# Patient Record
Sex: FEMALE
[De-identification: names, ages, dates, MRNs, and addresses within clinical notes are randomized per-mention and may not be internally consistent; named-entity substitution may affect disease eponyms.]

---

## 2018-02-19 LAB
ANION GAP SERPL CALC-SCNC: 13.9 MMOL/L (ref 8–16)
BASOPHILS # BLD AUTO: 0 10*3/UL (ref 0–0.1)
BASOPHILS NFR BLD AUTO: 0.2 % (ref 0–1)
BUN SERPL-MCNC: 12 MG/DL (ref 7–26)
BUN/CREAT SERPL: 15 (ref 6–25)
CALCIUM SERPL-MCNC: 9.3 MG/DL (ref 8.4–10.2)
CHLORIDE SERPL-SCNC: 105 MMOL/L (ref 98–107)
CO2 SERPL-SCNC: 28 MMOL/L (ref 22–29)
DEPRECATED NEUTROPHILS # BLD AUTO: 6.3 10*3/UL (ref 2.1–6.9)
EGFRCR SERPLBLD CKD-EPI 2021: > 60 ML/MIN (ref 60–?)
EOSINOPHIL # BLD AUTO: 0.1 10*3/UL (ref 0–0.4)
EOSINOPHIL NFR BLD AUTO: 1.1 % (ref 0–6)
ERYTHROCYTE [DISTWIDTH] IN CORD BLOOD: 12.9 % (ref 11.7–14.4)
GLUCOSE SERPLBLD-MCNC: 105 MG/DL (ref 74–118)
HCT VFR BLD AUTO: 40.6 % (ref 34.2–44.1)
HGB BLD-MCNC: 13.2 G/DL (ref 12–16)
LYMPHOCYTES # BLD: 2.3 10*3/UL (ref 1–3.2)
LYMPHOCYTES NFR BLD AUTO: 25.3 % (ref 18–39.1)
MCH RBC QN AUTO: 29.5 PG (ref 28–32)
MCHC RBC AUTO-ENTMCNC: 32.5 G/DL (ref 31–35)
MCV RBC AUTO: 90.8 FL (ref 81–99)
MONOCYTES # BLD AUTO: 0.4 10*3/UL (ref 0.2–0.8)
MONOCYTES NFR BLD AUTO: 4.1 % (ref 4.4–11.3)
NEUTS SEG NFR BLD AUTO: 69 % (ref 38.7–80)
PLATELET # BLD AUTO: 340 X10E3/UL (ref 140–360)
POTASSIUM SERPL-SCNC: 3.9 MMOL/L (ref 3.5–5.1)
RBC # BLD AUTO: 4.47 X10E6/UL (ref 3.6–5.1)
SODIUM SERPL-SCNC: 143 MMOL/L (ref 136–145)

## 2018-02-19 NOTE — DIAGNOSTIC IMAGING REPORT
PROCEDURE:

Frontal and lateral views of the chest.

 

COMPARISON: None.

 

INDICATIONS:   preop right mass under scapula friday

     

FINDINGS:

Lines/tubes:  None.

 

Lungs:  The lungs are well inflated. There is no evidence of pneumonia 

or pulmonary edema. Mild scarlike opacities and atelectasis in the lung 

bases.

 

Scattered calcified granulomas.

 

Pleura:  There is no pleural effusion or pneumothorax.

 

Heart and mediastinum:  The heart and the mediastinum are normal.

 

Bones:  No acute bony abnormality. Multilevel degenerative changes of 

the thoracic spine. The reported subscapular mass is visualized.

 

IMPRESSION: 

 

1.  No acute cardiopulmonary disease.

 

Dictated by:  Yovanny Dominguez M.D. on 2/19/2018 at 11:20     

Electronically approved by:  Yovanny Dominguez M.D. on 2/19/2018 at 11:20

## 2018-02-23 ENCOUNTER — HOSPITAL ENCOUNTER (OUTPATIENT)
Dept: HOSPITAL 88 - OR | Age: 68
Discharge: HOME | End: 2018-02-23
Attending: SURGERY
Payer: MEDICARE

## 2018-02-23 DIAGNOSIS — Z01.810: ICD-10-CM

## 2018-02-23 DIAGNOSIS — Z01.818: ICD-10-CM

## 2018-02-23 DIAGNOSIS — D17.79: Primary | ICD-10-CM

## 2018-02-23 DIAGNOSIS — Z01.812: ICD-10-CM

## 2018-02-23 PROCEDURE — 85025 COMPLETE CBC W/AUTO DIFF WBC: CPT

## 2018-02-23 PROCEDURE — 36415 COLL VENOUS BLD VENIPUNCTURE: CPT

## 2018-02-23 PROCEDURE — 23073 EXC SHOULDER TUM DEEP 5 CM/>: CPT

## 2018-02-23 PROCEDURE — 80048 BASIC METABOLIC PNL TOTAL CA: CPT

## 2018-02-23 PROCEDURE — 71046 X-RAY EXAM CHEST 2 VIEWS: CPT

## 2018-02-23 PROCEDURE — 88304 TISSUE EXAM BY PATHOLOGIST: CPT

## 2018-02-23 PROCEDURE — 93005 ELECTROCARDIOGRAM TRACING: CPT

## 2018-02-23 NOTE — XMS REPORT
Patient Summary Document

 Created on: 2018



LETICIA NORWOOD

External Reference #: [id number]

: 1950

Sex: Female



Demographics







 Address  [address] 

[place]  [address]

 

 Home Phone  [phone]

 

 Preferred Language  Unknown

 

 Marital Status  Unknown

 

 Gnosticism Affiliation  Unknown

 

 Race  Unknown

 

 Additional Race(s)  

 

 Ethnic Group  Unknown





Author







 Author  [facility]

 

 Address  Unknown

 

 Phone  Unavailable







Care Team Providers







 Care Team Member Name  Role  Phone

 

 GUS SANTANA  Unavailable  Unavailable







Problems

This patient has no known problems.



Allergies, Adverse Reactions, Alerts

This patient has no known allergies or adverse reactions.



Medications

This patient has no known medications.



Results







 Test Description  Test Time  Test Comments  Text Results  Atomic Results  
Result Comments









 CHEST 2 VIEWS            [place] [address]      Patient Name: LETICIA NORWOOD 
  MR #: [id number]    : 1950 Age/Sex: 67/F  Acct #: [id number] Req #
: [id number]  Adm Physician:     Ordered by: GUS SANTANA MD  Report #: 
[id number]   Location: OR  Room/Bed:     ________________________________________
___________________________________________________________    Procedure: [id number]
[id number] DX/CHEST 2 VIEWS  Exam Date:                             Exam Time:        
REPORT STATUS: Signed    PROCEDURE:   Frontal and lateral views of the chest.  
     COMPARISON: None.       INDICATIONS:   preop right mass under scapula 
friday           FINDINGS:   Lines/tubes:  None.       Lungs:  The lungs are 
well inflated. There is no evidence of pneumonia    or pulmonary edema. Mild 
scarlike opacities and atelectasis in the lung    bases.       Scattered 
calcified granulomas.       Pleura:  There is no pleural effusion or 
pneumothorax.       Heart and mediastinum:  The heart and the mediastinum are 
normal.       Bones:  No acute bony abnormality. Multilevel degenerative 
changes of    the thoracic spine. The reported subscapular mass is visualized. 
      IMPRESSION:        1.  No acute cardiopulmonary disease.       Dictated by
:  Breana Dominguez M.D. on 2018 at 11:20        Electronically approved by
:  Breana Dominguez M.D. on 2018 at 11:20                   Dictated By: 
BREANA DOMINGUEZ MD  Electronically Signed By: BREANA DOMINGUEZ MD on 18 
1120  Transcribed By: MO on 18 1120       COPY TO:   GUS SANTANA MD

## 2018-02-23 NOTE — OPERATIVE REPORT
DATE OF PROCEDURE:  February 23, 2018 



PREOPERATIVE DIAGNOSIS:  Mass of the right scapular area.  



POSTOPERATIVE DIAGNOSIS:  Intramuscular mass of the right scapular area. 



OPERATION PERFORMED:  Excision of intramuscular mass of the right scapular 

area.



ANESTHESIA:  General.



COMPLICATIONS:  None.



ESTIMATED BLOOD LOSS:  Minimal.



DESCRIPTION OF PROCEDURE:  With the patient lying in bed in the lateral 

position under good general anesthesia, the right back was prepped with 

Betadine solution and draped in the usual manner.  The area overlying the 

palpable mass in the scapular area was then infiltrated with 1/4 percent 

Marcaine solution and incision was made and was carried down through the 

subcutaneous tissue and through the superficial fascia.  A 

well-encapsulated lipomatous mass was encountered, which was then slowly 

and carefully  from all of the surrounding structures and it was 

partially intramuscular and this was slowly and carefully  from 

the muscle structures and totally and completely removed and sent for 

pathological examination.  The whole area was then thoroughly irrigated.  

Perfect hemostasis was ascertained.  Subcutaneous tissue and superficial 

fascia were approximated with interrupted sutures of 3-0 Vicryl and the 

skin was closed with interrupted vertical mattress sutures of 3-0 silk.  A 

dressing was applied.  The sponge, lap and needle count was correct.  

The patient tolerated the procedure and returned to the recovery room in 

stable condition.





DD:  02/23/2018 16:18

DT:  02/23/2018 16:35

Job#:  [id number]

## 2018-04-07 ENCOUNTER — HOSPITAL ENCOUNTER (INPATIENT)
Dept: HOSPITAL 88 - ER | Age: 68
LOS: 4 days | Discharge: HOME | DRG: 195 | End: 2018-04-11
Attending: INTERNAL MEDICINE | Admitting: INTERNAL MEDICINE
Payer: MEDICARE

## 2018-04-07 VITALS — BODY MASS INDEX: 38.58 KG/M2 | WEIGHT: 231.56 LBS | HEIGHT: 65 IN

## 2018-04-07 DIAGNOSIS — E66.9: ICD-10-CM

## 2018-04-07 DIAGNOSIS — D72.819: ICD-10-CM

## 2018-04-07 DIAGNOSIS — E78.5: ICD-10-CM

## 2018-04-07 DIAGNOSIS — J18.9: Primary | ICD-10-CM

## 2018-04-07 LAB
ALBUMIN SERPL-MCNC: 4.1 G/DL (ref 3.5–5)
ALBUMIN/GLOB SERPL: 0.9 {RATIO} (ref 0.8–2)
ALP SERPL-CCNC: 89 IU/L (ref 40–150)
ALT SERPL-CCNC: 20 IU/L (ref 0–55)
AMYLASE SERPL-CCNC: 66 U/L (ref 25–125)
ANION GAP SERPL CALC-SCNC: 15.1 MMOL/L (ref 8–16)
BACTERIA URNS QL MICRO: (no result) /HPF
BASOPHILS # BLD AUTO: 0 10*3/UL (ref 0–0.1)
BASOPHILS NFR BLD AUTO: 0.3 % (ref 0–1)
BILIRUB UR QL: NEGATIVE
BUN SERPL-MCNC: 13 MG/DL (ref 7–26)
BUN/CREAT SERPL: 13 (ref 6–25)
CALCIUM SERPL-MCNC: 9.2 MG/DL (ref 8.4–10.2)
CHLORIDE SERPL-SCNC: 98 MMOL/L (ref 98–107)
CLARITY UR: (no result)
CO2 SERPL-SCNC: 24 MMOL/L (ref 22–29)
COLOR UR: YELLOW
DEPRECATED NEUTROPHILS # BLD AUTO: 4.7 10*3/UL (ref 2.1–6.9)
EGFRCR SERPLBLD CKD-EPI 2021: 53 ML/MIN (ref 60–?)
EOSINOPHIL # BLD AUTO: 0 10*3/UL (ref 0–0.4)
EOSINOPHIL NFR BLD AUTO: 0.1 % (ref 0–6)
EPI CELLS URNS QL MICRO: (no result) /LPF
ERYTHROCYTE [DISTWIDTH] IN CORD BLOOD: 13.3 % (ref 11.7–14.4)
GLOBULIN PLAS-MCNC: 4.6 G/DL (ref 2.3–3.5)
GLUCOSE SERPLBLD-MCNC: 121 MG/DL (ref 74–118)
HCT VFR BLD AUTO: 43.1 % (ref 34.2–44.1)
HGB BLD-MCNC: 14.2 G/DL (ref 12–16)
KETONES UR QL STRIP.AUTO: NEGATIVE
LEUKOCYTE ESTERASE UR QL STRIP.AUTO: (no result)
LIPASE SERPL-CCNC: 26 U/L (ref 8–78)
LYMPHOCYTES # BLD: 1.7 10*3/UL (ref 1–3.2)
LYMPHOCYTES NFR BLD AUTO: 24.5 % (ref 18–39.1)
MCH RBC QN AUTO: 29.5 PG (ref 28–32)
MCHC RBC AUTO-ENTMCNC: 32.9 G/DL (ref 31–35)
MCV RBC AUTO: 89.4 FL (ref 81–99)
MONOCYTES # BLD AUTO: 0.6 10*3/UL (ref 0.2–0.8)
MONOCYTES NFR BLD AUTO: 8 % (ref 4.4–11.3)
NEUTS SEG NFR BLD AUTO: 66.8 % (ref 38.7–80)
NITRITE UR QL STRIP.AUTO: NEGATIVE
PLATELET # BLD AUTO: 313 X10E3/UL (ref 140–360)
POTASSIUM SERPL-SCNC: 4.1 MMOL/L (ref 3.5–5.1)
PROT UR QL STRIP.AUTO: (no result)
RBC # BLD AUTO: 4.82 X10E6/UL (ref 3.6–5.1)
SODIUM SERPL-SCNC: 133 MMOL/L (ref 136–145)
SP GR UR STRIP: 1.03 (ref 1.01–1.02)
UROBILINOGEN UR STRIP-MCNC: 0.2 MG/DL (ref 0.2–1)
WBC #/AREA URNS HPF: (no result) /HPF (ref 0–5)

## 2018-04-07 PROCEDURE — 82553 CREATINE MB FRACTION: CPT

## 2018-04-07 PROCEDURE — 96374 THER/PROPH/DIAG INJ IV PUSH: CPT

## 2018-04-07 PROCEDURE — 85025 COMPLETE CBC W/AUTO DIFF WBC: CPT

## 2018-04-07 PROCEDURE — 83690 ASSAY OF LIPASE: CPT

## 2018-04-07 PROCEDURE — 87086 URINE CULTURE/COLONY COUNT: CPT

## 2018-04-07 PROCEDURE — 74177 CT ABD & PELVIS W/CONTRAST: CPT

## 2018-04-07 PROCEDURE — 80053 COMPREHEN METABOLIC PANEL: CPT

## 2018-04-07 PROCEDURE — 82150 ASSAY OF AMYLASE: CPT

## 2018-04-07 PROCEDURE — 81001 URINALYSIS AUTO W/SCOPE: CPT

## 2018-04-07 PROCEDURE — 83605 ASSAY OF LACTIC ACID: CPT

## 2018-04-07 PROCEDURE — 93005 ELECTROCARDIOGRAM TRACING: CPT

## 2018-04-07 PROCEDURE — 84484 ASSAY OF TROPONIN QUANT: CPT

## 2018-04-07 PROCEDURE — 87040 BLOOD CULTURE FOR BACTERIA: CPT

## 2018-04-07 PROCEDURE — 87400 INFLUENZA A/B EACH AG IA: CPT

## 2018-04-07 PROCEDURE — 36415 COLL VENOUS BLD VENIPUNCTURE: CPT

## 2018-04-07 PROCEDURE — 71045 X-RAY EXAM CHEST 1 VIEW: CPT

## 2018-04-07 PROCEDURE — 99284 EMERGENCY DEPT VISIT MOD MDM: CPT

## 2018-04-07 PROCEDURE — 82550 ASSAY OF CK (CPK): CPT

## 2018-04-07 NOTE — DIAGNOSTIC IMAGING REPORT
EXAMINATION:  CHEST SINGLE (PORTABLE)    



INDICATION:           Fever. 



COMPARISON:  2/19/2018

     

FINDINGS:

TUBES and LINES:  None.



LUNGS:  Lungs are well inflated.  Stable left lung base calcification.   There

is no evidence of pneumonia or pulmonary edema.



PLEURA:  No pleural effusion or pneumothorax.



HEART AND MEDIASTINUM:  The cardiomediastinal silhouette is unremarkable.    



BONES AND SOFT TISSUES:  No acute osseous lesion.  Soft tissues are

unremarkable.



UPPER ABDOMEN: No free air under the diaphragm.    



IMPRESSION: 

No acute thoracic abnormality.





Signed by: Dr. Micheal Oliva M.D. on 4/7/2018 10:34 PM

## 2018-04-08 VITALS — SYSTOLIC BLOOD PRESSURE: 138 MMHG | DIASTOLIC BLOOD PRESSURE: 65 MMHG

## 2018-04-08 VITALS — DIASTOLIC BLOOD PRESSURE: 75 MMHG | SYSTOLIC BLOOD PRESSURE: 142 MMHG

## 2018-04-08 VITALS — DIASTOLIC BLOOD PRESSURE: 92 MMHG | SYSTOLIC BLOOD PRESSURE: 134 MMHG

## 2018-04-08 VITALS — SYSTOLIC BLOOD PRESSURE: 142 MMHG | DIASTOLIC BLOOD PRESSURE: 75 MMHG

## 2018-04-08 VITALS — SYSTOLIC BLOOD PRESSURE: 139 MMHG | DIASTOLIC BLOOD PRESSURE: 83 MMHG

## 2018-04-08 LAB
CK MB SERPL-MCNC: 0.5 NG/ML (ref 0–5)
CK MB SERPL-MCNC: 0.7 NG/ML (ref 0–5)
CK MB SERPL-MCNC: 0.7 NG/ML (ref 0–5)
CK SERPL-CCNC: 101 IU/L (ref 29–168)
CK SERPL-CCNC: 102 IU/L (ref 29–168)
CK SERPL-CCNC: 129 IU/L (ref 29–168)

## 2018-04-08 RX ADMIN — Medication PRN MG: at 15:31

## 2018-04-08 RX ADMIN — SODIUM CHLORIDE SCH MG: 900 INJECTION, SOLUTION INTRAVENOUS at 01:33

## 2018-04-08 RX ADMIN — SODIUM CHLORIDE SCH MLS/HR: 9 INJECTION, SOLUTION INTRAVENOUS at 09:30

## 2018-04-08 RX ADMIN — Medication PRN MG: at 23:17

## 2018-04-08 RX ADMIN — SODIUM CHLORIDE SCH MLS/HR: 9 INJECTION, SOLUTION INTRAVENOUS at 04:00

## 2018-04-08 RX ADMIN — SODIUM CHLORIDE SCH GM: 9 INJECTION, SOLUTION INTRAVENOUS at 01:33

## 2018-04-08 RX ADMIN — Medication PRN MG: at 04:47

## 2018-04-08 NOTE — HISTORY AND PHYSICAL
CHIEF COMPLAINT:  "I have high fever and cough."



HISTORY OF PRESENT ILLNESS:  This is a 67-year-old  woman who 

presented to [facility] Emergency Room with a 3-day 

history of worsening body aches, fever and dry cough.  The patient denies 

any sore throat or sneezing.  The patient states she did receive an 

influenza vaccine this season.  In the emergency room, the patient had a 

chest x-ray which was unremarkable, but a CT of the abdomen and pelvis with 

contrast did reveal findings consistent with right middle lobe pneumonia.  

Also in the emergency room, the patient had urinalysis done that revealed 

cloudy, yellow urine with trace blood, trace leukocyte esterase, 11-20 red 

blood cells per high-power field, and 21-50 white blood cells per 

high-power field.  Blood work in the emergency room also revealed BUN and 

creatinine of 13 and 1.03 respectively.  



REVIEW OF SYSTEMS

GENERAL:  Weight has been stable.  She has had fever and chills for the 

past few days.  

HEENT:  No headache.  No visual changes.

CARDIOVASCULAR/RESPIRATORY:  Nonproductive cough for the last 3 days.

:  Frequent urination for the last 2 days.

NEUROMUSCULAR:  Complains of diffuse muscle aches for the last 3 days.



PAST MEDICAL HISTORY 

1. Obesity.

2. Hyperlipidemia.



SURGICAL HISTORY:  Right scapular lipoma resection in February 2018.



FAMILY HISTORY:  Noncontributory.



SOCIAL HISTORY:  This woman is  and lives with her .  She is 

retired.  No history of tobacco or alcohol use.  



ALLERGIES:  NO KNOWN DRUG ALLERGIES.



MEDICATIONS:  Atorvastatin 20 mg nightly.



PHYSICAL EXAMINATION 

GENERAL:  She is awake, alert, fully oriented, in no acute distress.  Very 

pleasant.  Her  is at bedside.

VITAL SIGNS:  Height is 5 feet 5 inches, weight 230 pounds.  Calculated 

body mass index 38.  Blood pressure 142/65, pulse 80, respiratory rate 18, 

temperature 98.2.  On arrival in the emergency room, the patient's 

temperature was 102.6.

INTEGUMENT:  Skin is warm and dry.  No pallor, jaundice or diaphoresis.

HEENT:  Anicteric sclerae with moist mucous membranes.

NECK:  Supple.

CARDIOVASCULAR:  Regular rate and rhythm.

LUNGS:  No rales.  No rhonchi.  No wheezes.

ABDOMEN:  Obese.  Benign.

EXTREMITIES:  No edema or deformity.

NEUROLOGIC:  Intact.



DIAGNOSES

1. Right-sided pneumonia. 

2. Viral syndrome, likely influenza.

3. Urinary tract infection.

4. Obesity (calculated body mass index 38).



PLAN

1. Will order influenza nasal swab test, namely rapid flu, to assess for 

possible influenza A or B infection.

2. Start empiric Tamiflu for presumed influenza infection.

3. Continue intravenous antibiotics for the patient's pneumonia.

4. Follow urine and blood cultures.

5. Gentle intravenous fluids.



I spent 35 minutes in the care of this patient.



  



DD:  04/08/2018 12:33

DT:  04/08/2018 12:43

Job#:  [id number]  Dr. Dumas

## 2018-04-08 NOTE — DIAGNOSTIC IMAGING REPORT
EXAM: CT Abdomen and Pelvis WITH contrast  

INDICATION: Abdominal pain.

COMPARISON: None.

TECHNIQUE: Abdomen and pelvis were scanned utilizing a multidetector helical

scanner from the lung base to the pubic symphysis after administration of IV

contrast. Coronal and sagittal reformations were obtained. Routine protocol was

performed. Scan was performed when during portal venous phase.

            IV CONTRAST: 100 mL of Isovue-370

            ORAL CONTRAST: Water

            RADIATION DOSE: Total DLP: 716.74 mGy*cm

             Estimated effective dose: (DLP x 0.015 x size factor) mSv

            COMPLICATIONS: None



FINDINGS:



LINES and TUBES: None.



LOWER THORAX:  Right middle lobe groundglass nodularity and tree-in-bud

opacities compatible with infection.



HEPATOBILIARY:      No focal hepatic lesions. No biliary ductal dilation. 



GALLBLADDER: No radio-opaque stones or sludge.  No wall thickening.



SPLEEN: No splenomegaly. 



PANCREAS: No focal masses or ductal dilatation.  



ADRENALS: No adrenal nodules    



KIDNEYS/URETERS: Kidneys enhance symmetrically.  No hydronephrosis. No cystic

or solid mass lesions.  No stones.



GI TRACT: No abnormal distention, wall thickening, or evidence of bowel

obstruction.  There are diverticula within the colon without evidence of

diverticulitis.  Appendix is normal.



PELVIC ORGANS/BLADDER: Unremarkable.



LYMPH NODES: No lymphadenopathy.



VESSELS: There is mild atherosclerotic disease in the aorta and major arterial

branches.



PERITONEUM / RETROPERITONEUM: No free air or fluid.



BONES: There are degenerative changes in the lumbar spine.



SOFT TISSUES: Unremarkable.            



IMPRESSION: 

1.  Findings are consistent with right middle lobe pneumonia.

2.  No evidence of acute intra-abdominal or pelvic abnormality.



Signed by: Dr. Micheal Oliva M.D. on 4/8/2018 12:42 AM

## 2018-04-09 VITALS — DIASTOLIC BLOOD PRESSURE: 85 MMHG | SYSTOLIC BLOOD PRESSURE: 157 MMHG

## 2018-04-09 VITALS — SYSTOLIC BLOOD PRESSURE: 154 MMHG | DIASTOLIC BLOOD PRESSURE: 80 MMHG

## 2018-04-09 VITALS — DIASTOLIC BLOOD PRESSURE: 86 MMHG | SYSTOLIC BLOOD PRESSURE: 144 MMHG

## 2018-04-09 VITALS — DIASTOLIC BLOOD PRESSURE: 65 MMHG | SYSTOLIC BLOOD PRESSURE: 128 MMHG

## 2018-04-09 VITALS — DIASTOLIC BLOOD PRESSURE: 60 MMHG | SYSTOLIC BLOOD PRESSURE: 134 MMHG

## 2018-04-09 LAB
ALBUMIN SERPL-MCNC: 3.1 G/DL (ref 3.5–5)
ALBUMIN/GLOB SERPL: 1 {RATIO} (ref 0.8–2)
ALP SERPL-CCNC: 66 IU/L (ref 40–150)
ALT SERPL-CCNC: 14 IU/L (ref 0–55)
ANION GAP SERPL CALC-SCNC: 9.9 MMOL/L (ref 8–16)
ANISOCYTOSIS BLD QL SMEAR: SLIGHT
BASOPHILS # BLD AUTO: 0 10*3/UL (ref 0–0.1)
BASOPHILS NFR BLD AUTO: 0 % (ref 0–1)
BLASTS NFR BLD MANUAL: 1 %
BUN SERPL-MCNC: 8 MG/DL (ref 7–26)
BUN/CREAT SERPL: 11 (ref 6–25)
CALCIUM SERPL-MCNC: 8.3 MG/DL (ref 8.4–10.2)
CHLORIDE SERPL-SCNC: 106 MMOL/L (ref 98–107)
CO2 SERPL-SCNC: 27 MMOL/L (ref 22–29)
DEPRECATED NEUTROPHILS # BLD AUTO: 1.1 10*3/UL (ref 2.1–6.9)
EGFRCR SERPLBLD CKD-EPI 2021: > 60 ML/MIN (ref 60–?)
EOSINOPHIL # BLD AUTO: 0.1 10*3/UL (ref 0–0.4)
EOSINOPHIL NFR BLD AUTO: 1.5 % (ref 0–6)
ERYTHROCYTE [DISTWIDTH] IN CORD BLOOD: 13.2 % (ref 11.7–14.4)
GLOBULIN PLAS-MCNC: 3.2 G/DL (ref 2.3–3.5)
GLUCOSE SERPLBLD-MCNC: 92 MG/DL (ref 74–118)
HCT VFR BLD AUTO: 35.6 % (ref 34.2–44.1)
HGB BLD-MCNC: 11.6 G/DL (ref 12–16)
LYMPHOCYTES # BLD: 2.5 10*3/UL (ref 1–3.2)
LYMPHOCYTES NFR BLD AUTO: 63 % (ref 18–39.1)
LYMPHOCYTES NFR BLD MANUAL: 42 % (ref 19–48)
MCH RBC QN AUTO: 29.4 PG (ref 28–32)
MCHC RBC AUTO-ENTMCNC: 32.6 G/DL (ref 31–35)
MCV RBC AUTO: 90.4 FL (ref 81–99)
MONOCYTES # BLD AUTO: 0.3 10*3/UL (ref 0.2–0.8)
MONOCYTES NFR BLD AUTO: 8.2 % (ref 4.4–11.3)
MONOCYTES NFR BLD MANUAL: 9 % (ref 3.4–9)
MYELOCYTES NFR BLD MANUAL: 2 % (ref 0–0)
NEUTS SEG NFR BLD AUTO: 27.3 % (ref 38.7–80)
NEUTS SEG NFR BLD MANUAL: 32 % (ref 40–74)
PLAT MORPH BLD: NORMAL
PLATELET # BLD AUTO: 252 X10E3/UL (ref 140–360)
PLATELET # BLD EST: ADEQUATE 10*3/UL
POTASSIUM SERPL-SCNC: 3.9 MMOL/L (ref 3.5–5.1)
RBC # BLD AUTO: 3.94 X10E6/UL (ref 3.6–5.1)
RBC MORPH BLD: NORMAL
SODIUM SERPL-SCNC: 139 MMOL/L (ref 136–145)

## 2018-04-09 RX ADMIN — Medication PRN MG: at 19:38

## 2018-04-09 NOTE — DIAGNOSTIC IMAGING REPORT
EXAMINATION:  CHEST SINGLE (PORTABLE)    



INDICATION:           Pneumonia. 



COMPARISON:  4/7/2018

     

FINDINGS:

TUBES and LINES:  None.



LUNGS:  Lungs are well inflated.  Right lung base airspace opacity.      



PLEURA:  No pleural effusion or pneumothorax.



HEART AND MEDIASTINUM:  The cardiomediastinal silhouette is unremarkable.    



BONES AND SOFT TISSUES:  No acute osseous lesion.  Soft tissues are

unremarkable.



UPPER ABDOMEN: No free air under the diaphragm.    



IMPRESSION: 

Right lung base pneumonia may be involving the right lower lobe or right middle

lobe. Follow-up until resolution after treatment is recommended





Signed by: Dr. Micheal Oliva M.D. on 4/9/2018 6:55 AM

## 2018-04-10 VITALS — DIASTOLIC BLOOD PRESSURE: 60 MMHG | SYSTOLIC BLOOD PRESSURE: 140 MMHG

## 2018-04-10 VITALS — SYSTOLIC BLOOD PRESSURE: 158 MMHG | DIASTOLIC BLOOD PRESSURE: 85 MMHG

## 2018-04-10 VITALS — SYSTOLIC BLOOD PRESSURE: 158 MMHG | DIASTOLIC BLOOD PRESSURE: 70 MMHG

## 2018-04-10 VITALS — SYSTOLIC BLOOD PRESSURE: 150 MMHG | DIASTOLIC BLOOD PRESSURE: 65 MMHG

## 2018-04-10 VITALS — SYSTOLIC BLOOD PRESSURE: 160 MMHG | DIASTOLIC BLOOD PRESSURE: 75 MMHG

## 2018-04-10 VITALS — SYSTOLIC BLOOD PRESSURE: 155 MMHG | DIASTOLIC BLOOD PRESSURE: 75 MMHG

## 2018-04-10 RX ADMIN — SODIUM CHLORIDE SCH MG: 900 INJECTION, SOLUTION INTRAVENOUS at 00:24

## 2018-04-10 RX ADMIN — SODIUM CHLORIDE SCH GM: 9 INJECTION, SOLUTION INTRAVENOUS at 00:24

## 2018-04-10 RX ADMIN — SODIUM CHLORIDE SCH GM: 9 INJECTION, SOLUTION INTRAVENOUS at 22:14

## 2018-04-10 NOTE — PROGRESS NOTE
DATE:    



NO DICTATION.  1 second.













DD:  04/10/2018 17:04

DT:  04/10/2018 18:11

Job#:  [id number]

## 2018-04-10 NOTE — DISCHARGE SUMMARY
HISTORY OF PRESENT ILLNESS/HOSPITAL COURSE:  A 67-year-old female who has a 

past medical history positive for obesity, hyperlipidemia.  Came to [facility] emergency room with a 3-day history of 

worsening body aches, fever and dry cough.  She was found to have pneumonia 

and started on IV antibiotics, which were Zithromax and Rocephin.  Patient 

is doing well, no fever.  White blood count is in the low side.  Most 

likely she is going to go home tomorrow. 



PHYSICAL EXAM:  

HEART:  Shows regular rhythm.  Normal S1 and S2 sounds.  

LUNGS:  Clear bilaterally.  

ABDOMEN:  Soft.  

EXTREMITIES:  Show no evidence of cyanosis, edema or trauma.



LABORATORY STUDIES:  Showed a BMP with sodium 139, potassium 3.9, chloride 

106, CO2 27, BUN 8, creatinine 0.72, glucose 92.  CBC showed white blood 

count 3.82, hemoglobin 11.6, hematocrit 35.6, platelet count 252,000.  AST 

19, ALT 14, total bilirubin 0.2, alkaline phosphatase 66. 



FINAL IMPRESSIONS:  

1. Lobar pneumonia which is in the right lower lobe and right middle 

lobe.  

2. Hypercholesterolemia.  

3. Leukopenia.  

4. Obesity.  





Patient is going to be discharged on Zithromax 500 mg p.o. daily for 5 more 

days.  We are going to also give her a prescription of Ceftin 500 mg once a 

day for a total of 10 days.  Follow up with me in a couple of weeks.







 



 _________________________________

ISAK GALAN MD



DD:  04/10/2018 17:06

DT:  04/10/2018 17:13

Job#:  [id number]

## 2018-04-10 NOTE — PROGRESS NOTE
DATE:    



INTERNAL MEDICINE PROGRESS NOTE



SUBJECTIVE:  She was diagnosed with right lower lobe pneumonia.  She is 

doing well.



VITAL SIGNS:  Blood pressure 134/60, temperature 97.2, heart rate 78 per 

minute, respiratory rate is 19 per minute, oxygen saturation 94%.  



LAB:  On the blood work, BMP showed sodium 139, potassium 3.9, chloride 

106, CO2 27, BUN 8, creatinine 0.72, glucose 92.  On the CBC, white blood 

count 3.92, hemoglobin 11.6, hematocrit 35.6, platelet count 252,000.  AST 

19, ALT 14, total bilirubin 0.2, alkaline phosphatase 66.



PHYSICAL EXAMINATION:  

HEART:  Shows regular rhythm.  Normal S1 and S2 sounds. 

LUNGS:  Clear bilaterally. 

ABDOMEN:  Soft. 

EXTREMITIES:  Show no evidence of cyanosis, edema or trauma.  



CHEST X-RAY:  Showed right lower lobe and right middle lobe pneumonia. 



FINAL IMPRESSION:  Community acquired pneumonia.



PLAN OF TREATMENT:  Continue Zithromax 250 mg IV piggyback daily.  Rocephin 

1 gram IV piggyback once a day.  Continue rest of home medications.



DD:  04/09/2018 16:49

DT:  04/09/2018 17:00

Job#:  [id number]

## 2018-04-11 VITALS — DIASTOLIC BLOOD PRESSURE: 69 MMHG | SYSTOLIC BLOOD PRESSURE: 132 MMHG

## 2018-04-11 VITALS — SYSTOLIC BLOOD PRESSURE: 151 MMHG | DIASTOLIC BLOOD PRESSURE: 74 MMHG

## 2018-04-11 VITALS — SYSTOLIC BLOOD PRESSURE: 150 MMHG | DIASTOLIC BLOOD PRESSURE: 70 MMHG

## 2018-04-11 RX ADMIN — SODIUM CHLORIDE SCH MG: 900 INJECTION, SOLUTION INTRAVENOUS at 01:05
